# Patient Record
Sex: MALE | URBAN - METROPOLITAN AREA
[De-identification: names, ages, dates, MRNs, and addresses within clinical notes are randomized per-mention and may not be internally consistent; named-entity substitution may affect disease eponyms.]

---

## 2023-03-07 ENCOUNTER — NURSING HOME VISIT (OUTPATIENT)
Dept: POST ACUTE CARE | Facility: EXTERNAL LOCATION | Age: 88
End: 2023-03-07
Payer: MEDICARE

## 2023-03-07 DIAGNOSIS — I15.2 HYPERTENSION ASSOCIATED WITH DIABETES (MULTI): ICD-10-CM

## 2023-03-07 DIAGNOSIS — E87.20 METABOLIC ACIDOSIS: ICD-10-CM

## 2023-03-07 DIAGNOSIS — I44.30 AV BLOCK: ICD-10-CM

## 2023-03-07 DIAGNOSIS — E11.59 HYPERTENSION ASSOCIATED WITH DIABETES (MULTI): ICD-10-CM

## 2023-03-07 DIAGNOSIS — I25.118 CORONARY ARTERY DISEASE INVOLVING NATIVE HEART WITH OTHER FORM OF ANGINA PECTORIS, UNSPECIFIED VESSEL OR LESION TYPE (CMS-HCC): ICD-10-CM

## 2023-03-07 DIAGNOSIS — N17.9 AKI (ACUTE KIDNEY INJURY) (CMS-HCC): Primary | ICD-10-CM

## 2023-03-07 DIAGNOSIS — E11.40 TYPE 2 DIABETES MELLITUS WITH DIABETIC NEUROPATHY, UNSPECIFIED WHETHER LONG TERM INSULIN USE (MULTI): ICD-10-CM

## 2023-03-07 PROCEDURE — 99348 HOME/RES VST EST LOW MDM 30: CPT | Performed by: EMERGENCY MEDICINE

## 2023-03-07 NOTE — LETTER
Patient: Jersey Del Valle  : 1927    Encounter Date: 2023     Provider Impression     89-year-old      [facility]        PRINCIPAL DIAGNOSES: Acute kidney injury, hyperchloremic non-anion gap  metabolic acidosis, type B lactic acidosis, hypothermia, diabetes type  2, hypertension, hypomagnesemia, demand ischemia, possible urinary tract  infection, dehydration, atelectasis, Mobitz 1 atrioventricular block.           aspirin 81 mg oral delayed release tablet, 81 mg= 1 tabs, ORAL, QHS     Flomax 0.4 mg oral capsule, 0.4 mg= 1 caps, ORAL, QHS     lisinopril 10 mg oral tablet, 20 mg= 2 tabs, ORAL, QHS     multivitamin, 1 tabs, ORAL, QHS     Provide safe environment for the patient     Continue current medication regimen     OT PT and speech therapy     Bowel and bladder,skin care     Nutritional support      monitor and treat blood glucose     GI and DVT prophylaxis     PRN medications     Periodic lab work     Regular Follow up      Charting was completed using electronic voice recognition technology and may have unintended errors which may not have been completely corrected.        History of Present Illness  89-year-old      Patient is unable to give detailed history and therefore history is obtained from the chart     No acute complaints or concerns raised by nursing           History from hospitalization-  This 89-year-old gentleman presents to [facility] emergency department. This is a patient who apparently fell and then was left outside for some time he was unable to get up and move around. Apparently he does have some cognitive issues he lives by himself he does appear to be confused today on my examination the granddaughter noticed that on the ring, that the patient was on the ground outside he has been outside for some time EMS was called he is brought back into the hospital where he was resuscitated with fluids and was treated for hypothermia     Problem List/Past Medical History       Ongoing   Hypertension     Prostate cancer     Historical   No qualifying data     Procedure/Surgical History   No qualifying data available.     Medications      Inpatient   0.9%NaCl (Normal Saline) 1,000 mL, 1000 mL, IV     Ambien, 2.5 mg= 1 EA, ORAL, QHS/[id number], PRN     amLODIPine, 5 mg= 1 tabs, ORAL, BID     aspirin, 81 mg= 1 tabs, ORAL, QHS     ciprofloxacin = Cipro, 200 mg= 100 mL, IV Piggyback, [id number]     Flomax, 0.4 mg= 1 caps, ORAL, QHS     multivitamin, 1 tabs, ORAL, QHS     Saline Flush, 3 mL, IV Push, [id number]     Saline Flush, 3 mL, IV Push, PRN, PRN     Tylenol, 650 mg= 2 tabs, ORAL, [id number], PRN     Home   aspirin 81 mg oral delayed release tablet, 81 mg= 1 tabs, ORAL, QHS     Flomax 0.4 mg oral capsule, 0.4 mg= 1 caps, ORAL, QHS     lisinopril 10 mg oral tablet, 20 mg= 2 tabs, ORAL, QHS     multivitamin, 1 tabs, ORAL, QHS     Allergies      Bactrim     Hytrin     penicillin     Social History         Alcohol - Denies Alcohol Use        Substance Abuse - Denies Substance Abuse        Tobacco - Denies Tobacco Use      Review of Systems  All system review as allowed by patient condition and nursing input is negative        Physical Exam     Vital signs as per nursing/MA documentation were reviewd  General appearance: Alert and in no acute distress  HEENT: Normal Inspection  Neck - Normal Inspectiopn  Respiratory : No respiratory distress. Lungs are clear   Cardiovascular: heart rate normal. No gallop  Back - normal inspection  Skin inspection:Warm  Musculoskeletal : No deformities  Neuro : Limited exam. Baseline  Psychiatric : Cooperative        Results/Data  Records including but not limited to electronic medical records, relevant lab work and imaging from patient's health care facility encounter were reviewed and independently verified      Electronically Signed By: Sekou Shannon MD   4/11/23  2:56 PM

## 2023-04-11 PROBLEM — I25.10 CORONARY ARTERY DISEASE INVOLVING NATIVE HEART: Status: ACTIVE | Noted: 2023-04-11

## 2023-04-11 PROBLEM — E87.20 METABOLIC ACIDOSIS: Status: ACTIVE | Noted: 2023-04-11

## 2023-04-11 PROBLEM — N17.9 AKI (ACUTE KIDNEY INJURY) (CMS-HCC): Status: ACTIVE | Noted: 2023-04-11

## 2023-04-11 PROBLEM — E11.40 TYPE 2 DIABETES MELLITUS WITH DIABETIC NEUROPATHY (MULTI): Status: ACTIVE | Noted: 2023-04-11

## 2023-04-11 PROBLEM — I44.30 AV BLOCK: Status: ACTIVE | Noted: 2023-04-11

## 2023-04-11 PROBLEM — E11.59 HYPERTENSION ASSOCIATED WITH DIABETES (MULTI): Status: ACTIVE | Noted: 2023-04-11

## 2023-04-11 PROBLEM — I15.2 HYPERTENSION ASSOCIATED WITH DIABETES (MULTI): Status: ACTIVE | Noted: 2023-04-11

## 2023-04-11 NOTE — PROGRESS NOTES
Provider Impression     89-year-old      [facility]        PRINCIPAL DIAGNOSES: Acute kidney injury, hyperchloremic non-anion gap  metabolic acidosis, type B lactic acidosis, hypothermia, diabetes type  2, hypertension, hypomagnesemia, demand ischemia, possible urinary tract  infection, dehydration, atelectasis, Mobitz 1 atrioventricular block.           aspirin 81 mg oral delayed release tablet, 81 mg= 1 tabs, ORAL, QHS     Flomax 0.4 mg oral capsule, 0.4 mg= 1 caps, ORAL, QHS     lisinopril 10 mg oral tablet, 20 mg= 2 tabs, ORAL, QHS     multivitamin, 1 tabs, ORAL, QHS     Provide safe environment for the patient     Continue current medication regimen     OT PT and speech therapy     Bowel and bladder,skin care     Nutritional support      monitor and treat blood glucose     GI and DVT prophylaxis     PRN medications     Periodic lab work     Regular Follow up      Charting was completed using electronic voice recognition technology and may have unintended errors which may not have been completely corrected.        History of Present Illness  89-year-old      Patient is unable to give detailed history and therefore history is obtained from the chart     No acute complaints or concerns raised by nursing           History from hospitalization-  This 89-year-old gentleman presents to [facility] emergency department. This is a patient who apparently fell and then was left outside for some time he was unable to get up and move around. Apparently he does have some cognitive issues he lives by himself he does appear to be confused today on my examination the granddaughter noticed that on the ring, that the patient was on the ground outside he has been outside for some time EMS was called he is brought back into the hospital where he was resuscitated with fluids and was treated for hypothermia     Problem List/Past Medical History      Ongoing   Hypertension     Prostate cancer     Historical   No  qualifying data     Procedure/Surgical History   No qualifying data available.     Medications      Inpatient   0.9%NaCl (Normal Saline) 1,000 mL, 1000 mL, IV     Ambien, 2.5 mg= 1 EA, ORAL, QHS/[id number], PRN     amLODIPine, 5 mg= 1 tabs, ORAL, BID     aspirin, 81 mg= 1 tabs, ORAL, QHS     ciprofloxacin = Cipro, 200 mg= 100 mL, IV Piggyback, [id number]     Flomax, 0.4 mg= 1 caps, ORAL, QHS     multivitamin, 1 tabs, ORAL, QHS     Saline Flush, 3 mL, IV Push, [id number]     Saline Flush, 3 mL, IV Push, PRN, PRN     Tylenol, 650 mg= 2 tabs, ORAL, [id number], PRN     Home   aspirin 81 mg oral delayed release tablet, 81 mg= 1 tabs, ORAL, QHS     Flomax 0.4 mg oral capsule, 0.4 mg= 1 caps, ORAL, QHS     lisinopril 10 mg oral tablet, 20 mg= 2 tabs, ORAL, QHS     multivitamin, 1 tabs, ORAL, QHS     Allergies      Bactrim     Hytrin     penicillin     Social History         Alcohol - Denies Alcohol Use        Substance Abuse - Denies Substance Abuse        Tobacco - Denies Tobacco Use      Review of Systems  All system review as allowed by patient condition and nursing input is negative        Physical Exam     Vital signs as per nursing/MA documentation were reviewd  General appearance: Alert and in no acute distress  HEENT: Normal Inspection  Neck - Normal Inspectiopn  Respiratory : No respiratory distress. Lungs are clear   Cardiovascular: heart rate normal. No gallop  Back - normal inspection  Skin inspection:Warm  Musculoskeletal : No deformities  Neuro : Limited exam. Baseline  Psychiatric : Cooperative        Results/Data  Records including but not limited to electronic medical records, relevant lab work and imaging from patient's health care facility encounter were reviewed and independently verified

## 2023-06-13 ENCOUNTER — HOME VISIT (OUTPATIENT)
Dept: POST ACUTE CARE | Facility: EXTERNAL LOCATION | Age: 88
End: 2023-06-13
Payer: MEDICARE

## 2023-06-13 DIAGNOSIS — N17.9 AKI (ACUTE KIDNEY INJURY) (CMS-HCC): ICD-10-CM

## 2023-06-13 DIAGNOSIS — I25.118 CORONARY ARTERY DISEASE INVOLVING NATIVE HEART WITH OTHER FORM OF ANGINA PECTORIS, UNSPECIFIED VESSEL OR LESION TYPE (CMS-HCC): ICD-10-CM

## 2023-06-13 DIAGNOSIS — E11.59 HYPERTENSION ASSOCIATED WITH DIABETES (MULTI): Primary | ICD-10-CM

## 2023-06-13 DIAGNOSIS — I15.2 HYPERTENSION ASSOCIATED WITH DIABETES (MULTI): Primary | ICD-10-CM

## 2023-06-13 DIAGNOSIS — E11.40 TYPE 2 DIABETES MELLITUS WITH DIABETIC NEUROPATHY, UNSPECIFIED WHETHER LONG TERM INSULIN USE (MULTI): ICD-10-CM

## 2023-06-13 PROCEDURE — 99348 HOME/RES VST EST LOW MDM 30: CPT | Performed by: EMERGENCY MEDICINE

## 2023-06-22 NOTE — PROGRESS NOTES
Provider Impression        [facility]        PRINCIPAL DIAGNOSES: Acute kidney injury, hyperchloremic non-anion gap  metabolic acidosis, type B lactic acidosis, hypothermia, diabetes type  2, hypertension, hypomagnesemia, demand ischemia, possible urinary tract  infection, dehydration, atelectasis, Mobitz 1 atrioventricular block.           aspirin 81 mg oral delayed release tablet, 81 mg= 1 tabs, ORAL, QHS     Flomax 0.4 mg oral capsule, 0.4 mg= 1 caps, ORAL, QHS     lisinopril 10 mg oral tablet, 20 mg= 2 tabs, ORAL, QHS     multivitamin, 1 tabs, ORAL, QHS     Rx list reviewed.   PT and OT evaluation is in the process.   Routine safety measures, fall precautions, risk modification and alarm placement if needed for prevention of falls.   Skin care precautions, prevention of pressures sores at pressure points assessed.   Pt needs to be monitored frequently by nursing staff particularly at night time.   Any confusion, agitation or behavioural disturbance needs to be attended, as per home policy   rapid covid Ag assay need to be done, notify if positive.   If needed appropriate measures to be taken for alarm placements and assisted devices, pt was told not to get up and ambulate at night unless help and assist available at bedside,   labs will be done as per our routine protocol.   PO intake need to be monitored if consuming po.       Charting is done using voice recognition software and may contain errors which have not been completely corrected          History of Present Illness    Patient is unable to give detailed history and therefore history is obtained from the chart     No acute complaints or concerns raised by nursing           History from prior hospitalization-  This  gentleman presents to [facility] emergency department. This is a patient who apparently fell and then was left outside for some time he was unable to get up and move around. Apparently he does have some cognitive issues he  lives by himself he does appear to be confused today on my examination the granddaughter noticed that on the ring, that the patient was on the ground outside he has been outside for some time EMS was called he is brought back into the hospital where he was resuscitated with fluids and was treated for hypothermia     Problem List/Past Medical History      Ongoing   Hypertension     Prostate cancer     Historical   No qualifying data     Procedure/Surgical History   No qualifying data available.     Medications      Inpatient   0.9%NaCl (Normal Saline) 1,000 mL, 1000 mL, IV     Ambien, 2.5 mg= 1 EA, ORAL, QHS/[id number], PRN     amLODIPine, 5 mg= 1 tabs, ORAL, BID     aspirin, 81 mg= 1 tabs, ORAL, QHS     ciprofloxacin = Cipro, 200 mg= 100 mL, IV Piggyback, [id number]     Flomax, 0.4 mg= 1 caps, ORAL, QHS     multivitamin, 1 tabs, ORAL, QHS     Saline Flush, 3 mL, IV Push, [id number]     Saline Flush, 3 mL, IV Push, PRN, PRN     Tylenol, 650 mg= 2 tabs, ORAL, [id number], PRN     Home   aspirin 81 mg oral delayed release tablet, 81 mg= 1 tabs, ORAL, QHS     Flomax 0.4 mg oral capsule, 0.4 mg= 1 caps, ORAL, QHS     lisinopril 10 mg oral tablet, 20 mg= 2 tabs, ORAL, QHS     multivitamin, 1 tabs, ORAL, QHS     Allergies      Bactrim     Hytrin     penicillin     Social History         Alcohol - Denies Alcohol Use        Substance Abuse - Denies Substance Abuse        Tobacco - Denies Tobacco Use      Review of Systems  All system review as allowed by patient condition and nursing input is negative        Physical Exam     Vital signs as per nursing/MA documentation were reviewd  General appearance: Alert and in no acute distress  HEENT: Normal Inspection  Neck - Normal Inspectiopn  Respiratory : No respiratory distress. Lungs are clear   Cardiovascular: heart rate normal. No gallop  Back - normal inspection  Skin inspection:Warm  Musculoskeletal : No deformities  Neuro : Limited exam. Baseline  Psychiatric : Cooperative         Results/Data  Records including but not limited to electronic medical records, relevant lab work and imaging from patient's health care facility encounter were reviewed and independently verified

## 2023-09-07 ENCOUNTER — HOME VISIT (OUTPATIENT)
Dept: POST ACUTE CARE | Facility: EXTERNAL LOCATION | Age: 88
End: 2023-09-07
Payer: MEDICARE

## 2023-09-07 DIAGNOSIS — N17.9 AKI (ACUTE KIDNEY INJURY) (CMS-HCC): ICD-10-CM

## 2023-09-07 DIAGNOSIS — E11.40 TYPE 2 DIABETES MELLITUS WITH DIABETIC NEUROPATHY, UNSPECIFIED WHETHER LONG TERM INSULIN USE (MULTI): ICD-10-CM

## 2023-09-07 DIAGNOSIS — I15.2 HYPERTENSION ASSOCIATED WITH DIABETES (MULTI): ICD-10-CM

## 2023-09-07 DIAGNOSIS — E11.59 HYPERTENSION ASSOCIATED WITH DIABETES (MULTI): ICD-10-CM

## 2023-09-07 DIAGNOSIS — Z00.00 WELLNESS EXAMINATION: Primary | ICD-10-CM

## 2023-09-07 PROCEDURE — 99397 PER PM REEVAL EST PAT 65+ YR: CPT | Performed by: EMERGENCY MEDICINE

## 2023-09-07 PROCEDURE — 99348 HOME/RES VST EST LOW MDM 30: CPT | Performed by: EMERGENCY MEDICINE

## 2023-09-07 PROCEDURE — 99497 ADVNCD CARE PLAN 30 MIN: CPT | Performed by: EMERGENCY MEDICINE

## 2023-09-07 PROCEDURE — G0439 PPPS, SUBSEQ VISIT: HCPCS | Performed by: EMERGENCY MEDICINE

## 2023-09-18 NOTE — PROGRESS NOTES
Annual Medicare wellness and physical    Past Medical, Surgical and Family History: reviewed and updated in chart.   Medications and Supplements: Review of all medications by a prescribing practitioner or clinical pharmacist (such as prescriptions, OTCs, herbal therapies and supplements) documented in the medical record.    No, the patient is not using opioids.   Tobacco use: Non-User The patient has never smoked cigarettes.   Alcohol use: Non-User   Illicit drug use: Non-User   Current diet: well balanced diet.   Exercise Frequency: the patient does not exercise.   Depression/Suicide Screening: Patient has a current diagnosis of depression.    Hearing Impairment: none.   Cognitive Impairment: No cognitive impairment observed.     Bathing: needs assistance.   Dressing: needs assistance.   Walking: needs assistance.   Toileting: needs assistance.   Feeding: needs assistance.   Personal Hygiene: needs assistance.   Managing Finances: needs assistance.   Shopping: needs assistance.   Managing Medications: needs assistance.   Housework / Basic Home Maintenance: needs assistance.   Handling Transportation: needs assistance.   Preparing Meals: needs assistance.   Using the Telephone/ Communication Devices: needs assistance.    Falls Risk Screening:. Patient has not fallen in the last 6 months.   Home safety risk factors: none.   Advance directives:. Advanced Care Planning discussed and documented advance care plan or surrogate decision maker documented in the medical record.   A Conversation about Advance directives of at least 16 minutes has occurred. Actual time spent: 20   Topics discussed: Code status per nursing documentation.   Nursing home/rehabilitation note      Provider Impression        [facility]        PRINCIPAL DIAGNOSES: Acute kidney injury, hyperchloremic non-anion gap  metabolic acidosis, type B lactic acidosis, hypothermia, diabetes type  2, hypertension, hypomagnesemia, demand  ischemia, possible urinary tract  infection, dehydration, atelectasis, Mobitz 1 atrioventricular block.           aspirin 81 mg oral delayed release tablet, 81 mg= 1 tabs, ORAL, QHS     Flomax 0.4 mg oral capsule, 0.4 mg= 1 caps, ORAL, QHS     lisinopril 10 mg oral tablet, 20 mg= 2 tabs, ORAL, QHS     multivitamin, 1 tabs, ORAL, QHS     1. medications are reviewed      2. Continue with rehabilitative, supportive, and or restorative care as ordered and as the patient tolerates     3. Laboratory evaluations will be monitored on an ongoing as needed basis     4. Medications have been cross-referenced with the patient's diagnoses list, and medications reductions have been considered and/or implemented.     5. Pharmacy recommendations are addressed on an ongoing as needed basis.     6. Controlled substances have been electronically scripted every 60 days for opiates and others of similar schedule, and every 6 months for sedative/hypnotics and others of similar schedule.     7. Nursing has been queried about any potential adverse events that need to be reported to me.    Salient information and adjustment of care plan pertaining to this individual patient interaction today are the following:      A. We will continue with restorative and supportive care as the patient tolerates    B. Laboratory examinations will continue to be drawn on an ongoing as-needed basis. The patient's weight needs to be monitored and if needed we may need to institute appetite stimulating medication    C. The patient's long term prognosis is guarded    Charting is done using voice recognition software and may contain errors which may not have been completely corrected          History of Present Illness    Patient is unable to give detailed history and therefore history is obtained from the chart     No acute complaints or concerns raised by nursing           History from prior hospitalization-  This  gentleman presents to [facility] emergency  department. This is a patient who apparently fell and then was left outside for some time he was unable to get up and move around. Apparently he does have some cognitive issues he lives by himself he does appear to be confused today on my examination the granddaughter noticed that on the ring, that the patient was on the ground outside he has been outside for some time EMS was called he is brought back into the hospital where he was resuscitated with fluids and was treated for hypothermia     Problem List/Past Medical History      Ongoing   Hypertension     Prostate cancer     Historical   No qualifying data     Procedure/Surgical History   No qualifying data available.     Medications      Inpatient   0.9%NaCl (Normal Saline) 1,000 mL, 1000 mL, IV     Ambien, 2.5 mg= 1 EA, ORAL, QHS/[id number], PRN     amLODIPine, 5 mg= 1 tabs, ORAL, BID     aspirin, 81 mg= 1 tabs, ORAL, QHS     ciprofloxacin = Cipro, 200 mg= 100 mL, IV Piggyback, [id number]     Flomax, 0.4 mg= 1 caps, ORAL, QHS     multivitamin, 1 tabs, ORAL, QHS     Saline Flush, 3 mL, IV Push, [id number]     Saline Flush, 3 mL, IV Push, PRN, PRN     Tylenol, 650 mg= 2 tabs, ORAL, [id number], PRN     Home   aspirin 81 mg oral delayed release tablet, 81 mg= 1 tabs, ORAL, QHS     Flomax 0.4 mg oral capsule, 0.4 mg= 1 caps, ORAL, QHS     lisinopril 10 mg oral tablet, 20 mg= 2 tabs, ORAL, QHS     multivitamin, 1 tabs, ORAL, QHS     Allergies      Bactrim     Hytrin     penicillin     Social History         Alcohol - Denies Alcohol Use        Substance Abuse - Denies Substance Abuse        Tobacco - Denies Tobacco Use      Review of Systems  All system review as allowed by patient condition and nursing input is negative        Physical Exam     Vital signs as per nursing/MA documentation were reviewd  General appearance: Alert and in no acute distress  HEENT: Normal Inspection  Neck - Normal Inspectiopn  Respiratory : No respiratory distress. Lungs are clear    Cardiovascular: heart rate normal. No gallop  Back - normal inspection  Skin inspection:Warm  Musculoskeletal : No deformities  Neuro : Limited exam. Baseline  Psychiatric : Cooperative        Results/Data  Records including but not limited to electronic medical records, relevant lab work and imaging from patient's health care facility encounter were reviewed and independently verified

## 2024-01-02 ENCOUNTER — HOME VISIT (OUTPATIENT)
Dept: POST ACUTE CARE | Facility: EXTERNAL LOCATION | Age: 89
End: 2024-01-02
Payer: MEDICARE

## 2024-01-02 DIAGNOSIS — E11.59 HYPERTENSION ASSOCIATED WITH DIABETES (MULTI): Primary | ICD-10-CM

## 2024-01-02 DIAGNOSIS — I15.2 HYPERTENSION ASSOCIATED WITH DIABETES (MULTI): Primary | ICD-10-CM

## 2024-01-02 DIAGNOSIS — E87.20 METABOLIC ACIDOSIS: ICD-10-CM

## 2024-01-02 DIAGNOSIS — N17.9 AKI (ACUTE KIDNEY INJURY) (CMS-HCC): ICD-10-CM

## 2024-01-02 DIAGNOSIS — E11.40 TYPE 2 DIABETES MELLITUS WITH DIABETIC NEUROPATHY, UNSPECIFIED WHETHER LONG TERM INSULIN USE (MULTI): ICD-10-CM

## 2024-01-02 PROCEDURE — 99349 HOME/RES VST EST MOD MDM 40: CPT | Performed by: EMERGENCY MEDICINE

## 2024-01-19 NOTE — PROGRESS NOTES
Provider Impression        [facility]        PRINCIPAL DIAGNOSES: Acute kidney injury, hyperchloremic non-anion gap  metabolic acidosis, type B lactic acidosis, hypothermia, diabetes type  2, hypertension, hypomagnesemia, demand ischemia, possible urinary tract  infection, dehydration, atelectasis, Mobitz 1 atrioventricular block.           aspirin 81 mg oral delayed release tablet, 81 mg= 1 tabs, ORAL, QHS     Flomax 0.4 mg oral capsule, 0.4 mg= 1 caps, ORAL, QHS     lisinopril 10 mg oral tablet, 20 mg= 2 tabs, ORAL, QHS     multivitamin, 1 tabs, ORAL, QHS     This patient was seen for my regular monthly visit, nursing evaluations and nursing notes were reviewed, interim events are reviewed, interim concerns and messages were reviewed as we have communicated with nursing staff.  Any issues with the falls, skin care impairment, declining physical condition are reviewed and noted, diagnosis list were reviewed, list of medications were reviewed, living will related issues were reviewed, overall patient has been doing well, any declining in patient's condition or any change in patient's condition needs to be notified to physician promptly, discussed with nursing staff, if needed would communicate with family.  Patient stays confined here at the facility for long-term care, there are always concerns about long-term care related issues and concerns.  Nursing staff is trying their best to keep patient safe, all sort of measures has been taken to keep patient safe and comfortable.           History of Present Illness    Patient is unable to give detailed history and therefore history is obtained from the chart     No acute complaints or concerns raised by nursing           History from prior hospitalization-  This  gentleman presents to [facility] emergency department. This is a patient who apparently fell and then was left outside for some time he was unable to get up and move around. Apparently he does  have some cognitive issues he lives by himself he does appear to be confused today on my examination the granddaughter noticed that on the ring, that the patient was on the ground outside he has been outside for some time EMS was called he is brought back into the hospital where he was resuscitated with fluids and was treated for hypothermia     Problem List/Past Medical History      Ongoing   Hypertension     Prostate cancer     Historical   No qualifying data     Procedure/Surgical History   No qualifying data available.     Medications      Inpatient   0.9%NaCl (Normal Saline) 1,000 mL, 1000 mL, IV     Ambien, 2.5 mg= 1 EA, ORAL, QHS/[id number], PRN     amLODIPine, 5 mg= 1 tabs, ORAL, BID     aspirin, 81 mg= 1 tabs, ORAL, QHS     ciprofloxacin = Cipro, 200 mg= 100 mL, IV Piggyback, [id number]     Flomax, 0.4 mg= 1 caps, ORAL, QHS     multivitamin, 1 tabs, ORAL, QHS     Saline Flush, 3 mL, IV Push, [id number]     Saline Flush, 3 mL, IV Push, PRN, PRN     Tylenol, 650 mg= 2 tabs, ORAL, [id number], PRN     Home   aspirin 81 mg oral delayed release tablet, 81 mg= 1 tabs, ORAL, QHS     Flomax 0.4 mg oral capsule, 0.4 mg= 1 caps, ORAL, QHS     lisinopril 10 mg oral tablet, 20 mg= 2 tabs, ORAL, QHS     multivitamin, 1 tabs, ORAL, QHS     Allergies      Bactrim     Hytrin     penicillin     Social History         Alcohol - Denies Alcohol Use        Substance Abuse - Denies Substance Abuse        Tobacco - Denies Tobacco Use      Review of Systems  All system review as allowed by patient condition and nursing input is negative        Physical Exam     Vital signs as per nursing/MA documentation were reviewd  General appearance: Alert and in no acute distress  HEENT: Normal Inspection  Neck - Normal Inspectiopn  Respiratory : No respiratory distress. Lungs are clear   Cardiovascular: heart rate normal. No gallop  Back - normal inspection  Skin inspection:Warm  Musculoskeletal : No deformities  Neuro : Limited exam.  Baseline  Psychiatric : Cooperative        Results/Data  Records including but not limited to electronic medical records, relevant lab work and imaging from patient's health care facility encounter were reviewed and independently verified

## 2024-04-02 ENCOUNTER — HOME VISIT (OUTPATIENT)
Dept: POST ACUTE CARE | Facility: EXTERNAL LOCATION | Age: 89
End: 2024-04-02
Payer: MEDICARE

## 2024-04-02 DIAGNOSIS — I25.118 CORONARY ARTERY DISEASE INVOLVING NATIVE HEART WITH OTHER FORM OF ANGINA PECTORIS, UNSPECIFIED VESSEL OR LESION TYPE (CMS-HCC): ICD-10-CM

## 2024-04-02 DIAGNOSIS — N17.9 AKI (ACUTE KIDNEY INJURY) (CMS-HCC): ICD-10-CM

## 2024-04-02 DIAGNOSIS — E11.59 HYPERTENSION ASSOCIATED WITH DIABETES (MULTI): Primary | ICD-10-CM

## 2024-04-02 DIAGNOSIS — E87.20 METABOLIC ACIDOSIS: ICD-10-CM

## 2024-04-02 DIAGNOSIS — I15.2 HYPERTENSION ASSOCIATED WITH DIABETES (MULTI): Primary | ICD-10-CM

## 2024-04-02 PROCEDURE — 99348 HOME/RES VST EST LOW MDM 30: CPT | Performed by: EMERGENCY MEDICINE

## 2024-04-14 NOTE — PROGRESS NOTES
Provider Impression        [facility]        PRINCIPAL DIAGNOSES: Acute kidney injury, hyperchloremic non-anion gap  metabolic acidosis, type B lactic acidosis, hypothermia, diabetes type  2, hypertension, hypomagnesemia, demand ischemia, possible urinary tract  infection, dehydration, atelectasis, Mobitz 1 atrioventricular block.           aspirin 81 mg oral delayed release tablet, 81 mg= 1 tabs, ORAL, QHS     Flomax 0.4 mg oral capsule, 0.4 mg= 1 caps, ORAL, QHS     lisinopril 10 mg oral tablet, 20 mg= 2 tabs, ORAL, QHS     multivitamin, 1 tabs, ORAL, QHS     -Fall prevention    -Cognitive engagement     -Monitor and treat blood pressure     -Aggressive decubitus ulcer prevention.     -Bowel and bladder care     -Optimal nutrition and supplementation as needed     -GI  and DVT prophylaxis     -Symptom control     -Ambulation as tolerated     -Will follow    Charting is done using voice recognition software and may contain errors which have not been completely corrected          History of Present Illness    Patient is unable to give detailed history and therefore history is obtained from the chart     No acute complaints or concerns raised by nursing           History from prior hospitalization-  This  gentleman presents to [facility] emergency department. This is a patient who apparently fell and then was left outside for some time he was unable to get up and move around. Apparently he does have some cognitive issues he lives by himself he does appear to be confused today on my examination the granddaughter noticed that on the ring, that the patient was on the ground outside he has been outside for some time EMS was called he is brought back into the hospital where he was resuscitated with fluids and was treated for hypothermia     Problem List/Past Medical History      Ongoing   Hypertension     Prostate cancer     Historical   No qualifying data     Procedure/Surgical History   No qualifying  data available.     Medications      Inpatient   0.9%NaCl (Normal Saline) 1,000 mL, 1000 mL, IV     Ambien, 2.5 mg= 1 EA, ORAL, QHS/[id number], PRN     amLODIPine, 5 mg= 1 tabs, ORAL, BID     aspirin, 81 mg= 1 tabs, ORAL, QHS     ciprofloxacin = Cipro, 200 mg= 100 mL, IV Piggyback, [id number]     Flomax, 0.4 mg= 1 caps, ORAL, QHS     multivitamin, 1 tabs, ORAL, QHS     Saline Flush, 3 mL, IV Push, [id number]     Saline Flush, 3 mL, IV Push, PRN, PRN     Tylenol, 650 mg= 2 tabs, ORAL, [id number], PRN     Home   aspirin 81 mg oral delayed release tablet, 81 mg= 1 tabs, ORAL, QHS     Flomax 0.4 mg oral capsule, 0.4 mg= 1 caps, ORAL, QHS     lisinopril 10 mg oral tablet, 20 mg= 2 tabs, ORAL, QHS     multivitamin, 1 tabs, ORAL, QHS     Allergies      Bactrim     Hytrin     penicillin     Social History         Alcohol - Denies Alcohol Use        Substance Abuse - Denies Substance Abuse        Tobacco - Denies Tobacco Use      Review of Systems  All system review as allowed by patient condition and nursing input is negative        Physical Exam     Vital signs as per nursing/MA documentation were reviewd  General appearance: Alert and in no acute distress  HEENT: Normal Inspection  Neck - Normal Inspectiopn  Respiratory : No respiratory distress. Lungs are clear   Cardiovascular: heart rate normal. No gallop  Back - normal inspection  Skin inspection:Warm  Musculoskeletal : No deformities  Neuro : Limited exam. Baseline  Psychiatric : Cooperative        Results/Data  Records including but not limited to electronic medical records, relevant lab work and imaging from patient's health care facility encounter were reviewed and independently verified

## 2024-07-16 ENCOUNTER — HOME VISIT (OUTPATIENT)
Dept: POST ACUTE CARE | Facility: EXTERNAL LOCATION | Age: 89
End: 2024-07-16
Payer: MEDICARE

## 2024-07-16 DIAGNOSIS — I15.2 HYPERTENSION ASSOCIATED WITH DIABETES (MULTI): ICD-10-CM

## 2024-07-16 DIAGNOSIS — I25.118 CORONARY ARTERY DISEASE INVOLVING NATIVE HEART WITH OTHER FORM OF ANGINA PECTORIS, UNSPECIFIED VESSEL OR LESION TYPE (CMS-HCC): ICD-10-CM

## 2024-07-16 DIAGNOSIS — E87.20 METABOLIC ACIDOSIS: ICD-10-CM

## 2024-07-16 DIAGNOSIS — E11.59 HYPERTENSION ASSOCIATED WITH DIABETES (MULTI): ICD-10-CM

## 2024-07-16 DIAGNOSIS — E11.40 TYPE 2 DIABETES MELLITUS WITH DIABETIC NEUROPATHY, UNSPECIFIED WHETHER LONG TERM INSULIN USE (MULTI): ICD-10-CM

## 2024-07-16 DIAGNOSIS — N17.9 AKI (ACUTE KIDNEY INJURY) (CMS-HCC): Primary | ICD-10-CM

## 2024-07-16 PROCEDURE — 99349 HOME/RES VST EST MOD MDM 40: CPT | Performed by: EMERGENCY MEDICINE

## 2024-08-04 NOTE — PROGRESS NOTES
Provider Impression        [facility]        PRINCIPAL DIAGNOSES:     Acute kidney injury, hyperchloremic non-anion gap  metabolic acidosis, type B lactic acidosis, hypothermia, diabetes type  2, hypertension, hypomagnesemia, demand ischemia, possible urinary tract  infection, dehydration, atelectasis, Mobitz 1 atrioventricular block.            Bisacodyl Suppository 10 MG   Insert 1 application rectally every 24 hours as needed for constipation  Pharmacy Active 4/14/2022 12:00  4/14/2022  Diff-Stat Capsule (Probiotic Product)   Give 1 capsule by mouth one time a day for GI health  Pharmacy Active 4/15/2022 08:00  4/14/2022  MiraLax Powder 17 GM/SCOOP (Polyethylene Glycol 3350)   Give 17 gram by mouth every 24 hours as needed for constipation  Pharmacy Active 4/14/2022 12:30  4/14/2022  Aspirin Tablet   Give 81 mg by mouth one time a day related to LONG TERM (CURRENT) USE OF ASPIRIN (Z79.82)  Pharmacy Active 5/24/2022 08:00  5/17/2022  Lisinopril Tablet   Give 10 mg by mouth one time a day related to ESSENTIAL (PRIMARY) HYPERTENSION (I10)  Pharmacy Active 8/19/2022 08:00  8/18/2022  Tylenol Tablet (Acetaminophen)   Give 650 mg by mouth every 6 hours as needed for pain and/or fever  Pharmacy Active 8/23/2022 13:00  8/23/2022  Cholecalciferol Capsule   Give 5000 unit by mouth one time a day for vitamin D deficiency  Pharmacy Active 10/19/2022 08:00  10/18/2022  Flomax Capsule (Tamsulosin HCl)   Give 0.4 mg by mouth in the evening related to BENIGN PROSTATIC HYPERPLASIA WITHOUT LOWER URINARY TRACT SYMPTOMS (N40.0)  Pharmacy Active 1/10/2023 17:00  1/10/2023  Donepezil HCl Tablet   Give 10 mg by mouth in the evening related to AGE-RELATED COGNITIVE DECLINE (R41.81)  Pharmacy Active 1/10/2023 17:00  1/10/2023  Multi-Vitamins Tablet (Multiple Vitamin)   Give 1 tablet by mouth in the evening for supplement  Pharmacy Active 1/10/2023 17:00  1/10/2023  amLODIPine Besylate Tablet   Give 5 mg by mouth two  times a day related to ESSENTIAL (PRIMARY) HYPERTENSION (I10)  Pharmacy Active 1/10/2023 17:00  1/10/2023  Artificial Tears Ophthalmic Solution (Carboxymethylcellulose Sodium (Ophth))   Instill 2 drop in both eyes as needed for itchy eyes/dry eyes TWICE DAILY  Pharmacy Active 1/19/2023 12:30  1/19/2023  Eucerin External Cream (Skin Protectants, Misc.)   Apply to affected areas topically in the morning for dry, flaky skin  Pharmacy Active 10/6/2023 08:00  8/2/2024     Provide safe environment for the patient     Continue current medication regimen     OT PT and speech therapy     Bowel and bladder,skin care     Nutritional support     monitor and treat blood glucose     GI  and DVT prophylaxis     PRN medications     Periodic lab work    Regular Follow up    Charting is done using voice recognition software and may contain errors which have not been completely corrected          History of Present Illness    Patient is unable to give detailed history and therefore history is obtained from the chart     No acute complaints or concerns raised by nursing           History from prior hospitalization-  This  gentleman presents to [facility] emergency department. This is a patient who apparently fell and then was left outside for some time he was unable to get up and move around. Apparently he does have some cognitive issues he lives by himself he does appear to be confused today on my examination the granddaughter noticed that on the ring, that the patient was on the ground outside he has been outside for some time EMS was called he is brought back into the hospital where he was resuscitated with fluids and was treated for hypothermia     Problem List/Past Medical History      Ongoing   Hypertension     Prostate cancer     Historical   No qualifying data     Procedure/Surgical History   No qualifying data available.     Medications      Inpatient   0.9%NaCl (Normal Saline) 1,000 mL, 1000 mL, IV     Ambien, 2.5 mg= 1 EA,  ORAL, [id number]/[id number], PRN     amLODIPine, 5 mg= 1 tabs, ORAL, BID     aspirin, 81 mg= 1 tabs, ORAL, QHS     ciprofloxacin = Cipro, 200 mg= 100 mL, IV Piggyback, [id number]     Flomax, 0.4 mg= 1 caps, ORAL, QHS     multivitamin, 1 tabs, ORAL, QHS     Saline Flush, 3 mL, IV Push, [id number]     Saline Flush, 3 mL, IV Push, PRN, PRN     Tylenol, 650 mg= 2 tabs, ORAL, [id number], PRN     Home   aspirin 81 mg oral delayed release tablet, 81 mg= 1 tabs, ORAL, QHS     Flomax 0.4 mg oral capsule, 0.4 mg= 1 caps, ORAL, QHS     lisinopril 10 mg oral tablet, 20 mg= 2 tabs, ORAL, QHS     multivitamin, 1 tabs, ORAL, QHS     Allergies      Bactrim     Hytrin     penicillin     Social History         Alcohol - Denies Alcohol Use        Substance Abuse - Denies Substance Abuse        Tobacco - Denies Tobacco Use      Review of Systems  All system review as allowed by patient condition and nursing input is negative        Physical Exam     Vital signs as per nursing/MA documentation were reviewd  General appearance: Alert and in no acute distress  HEENT: Normal Inspection  Neck - Normal Inspectiopn  Respiratory : No respiratory distress. Lungs are clear   Cardiovascular: heart rate normal. No gallop  Back - normal inspection  Skin inspection:Warm  Musculoskeletal : No deformities  Neuro : Limited exam. Baseline  Psychiatric : Cooperative        Results/Data  Records including but not limited to electronic medical records, relevant lab work and imaging from patient's health care facility encounter were reviewed and independently verified

## 2024-10-01 ENCOUNTER — NURSING HOME VISIT (OUTPATIENT)
Dept: POST ACUTE CARE | Facility: EXTERNAL LOCATION | Age: 89
End: 2024-10-01
Payer: MEDICARE

## 2024-10-01 DIAGNOSIS — I25.118 CORONARY ARTERY DISEASE INVOLVING NATIVE HEART WITH OTHER FORM OF ANGINA PECTORIS, UNSPECIFIED VESSEL OR LESION TYPE: ICD-10-CM

## 2024-10-01 DIAGNOSIS — E87.20 METABOLIC ACIDOSIS: ICD-10-CM

## 2024-10-01 DIAGNOSIS — E11.40 TYPE 2 DIABETES MELLITUS WITH DIABETIC NEUROPATHY, UNSPECIFIED WHETHER LONG TERM INSULIN USE (MULTI): ICD-10-CM

## 2024-10-01 DIAGNOSIS — I15.2 HYPERTENSION ASSOCIATED WITH DIABETES (MULTI): ICD-10-CM

## 2024-10-01 DIAGNOSIS — Z00.00 WELLNESS EXAMINATION: Primary | ICD-10-CM

## 2024-10-01 DIAGNOSIS — E11.59 HYPERTENSION ASSOCIATED WITH DIABETES (MULTI): ICD-10-CM

## 2024-10-01 PROCEDURE — 99397 PER PM REEVAL EST PAT 65+ YR: CPT | Performed by: EMERGENCY MEDICINE

## 2024-10-01 PROCEDURE — G0439 PPPS, SUBSEQ VISIT: HCPCS | Performed by: EMERGENCY MEDICINE

## 2024-10-01 PROCEDURE — 99348 HOME/RES VST EST LOW MDM 30: CPT | Performed by: EMERGENCY MEDICINE

## 2024-10-01 PROCEDURE — 99497 ADVNCD CARE PLAN 30 MIN: CPT | Performed by: EMERGENCY MEDICINE

## 2024-10-01 NOTE — LETTER
Patient: Jersey Del Valle  : 1927    Encounter Date: 10/01/2024       Provider Impression        [facility]        PRINCIPAL DIAGNOSES:     Acute kidney injury, hyperchloremic non-anion gap  metabolic acidosis, type B lactic acidosis, hypothermia, diabetes type  2, hypertension, hypomagnesemia, demand ischemia, possible urinary tract  infection, dehydration, atelectasis, Mobitz 1 atrioventricular block.           Bisacodyl Suppository 10 MG   Insert 1 application rectally every 24 hours as needed for constipation  Pharmacy Active 2022 12:00  2022  Diff-Stat Capsule (Probiotic Product)   Give 1 capsule by mouth one time a day for GI health  Pharmacy Active 4/15/2022 08:00  2022  MiraLax Powder 17 GM/SCOOP (Polyethylene Glycol 3350)   Give 17 gram by mouth every 24 hours as needed for constipation  Pharmacy Active 2022 12:30  2022  Aspirin Tablet   Give 81 mg by mouth one time a day related to LONG TERM (CURRENT) USE OF ASPIRIN (Z79.82)  Pharmacy Active 2022 08:00  2022  Lisinopril Tablet   Give 10 mg by mouth one time a day related to ESSENTIAL (PRIMARY) HYPERTENSION (I10)  Pharmacy Active 2022 08:00  2022  Tylenol Tablet (Acetaminophen)   Give 650 mg by mouth every 6 hours as needed for pain and/or fever  Pharmacy Active 2022 13:00  2022  Cholecalciferol Capsule   Give 5000 unit by mouth one time a day for vitamin D deficiency  Pharmacy Active 10/19/2022 08:00  10/18/2022  Flomax Capsule (Tamsulosin HCl)   Give 0.4 mg by mouth in the evening related to BENIGN PROSTATIC HYPERPLASIA WITHOUT LOWER URINARY TRACT SYMPTOMS (N40.0)  Pharmacy Active 1/10/2023 17:00  1/10/2023  Donepezil HCl Tablet   Give 10 mg by mouth in the evening related to AGE-RELATED COGNITIVE DECLINE (R41.81)  Pharmacy Active 1/10/2023 17:00  1/10/2023  Multi-Vitamins Tablet (Multiple Vitamin)   Give 1 tablet by mouth in the evening for supplement  Pharmacy Active 1/10/2023  17:00  1/10/2023  amLODIPine Besylate Tablet   Give 5 mg by mouth two times a day related to ESSENTIAL (PRIMARY) HYPERTENSION (I10)  Pharmacy Active 1/10/2023 17:00  1/10/2023  Artificial Tears Ophthalmic Solution (Carboxymethylcellulose Sodium (Ophth))   Instill 2 drop in both eyes as needed for itchy eyes/dry eyes TWICE DAILY  Pharmacy Active 1/19/2023 12:30  1/19/2023  Eucerin External Cream (Skin Protectants, Misc.)   Apply to affected areas topically in the morning for dry, flaky skin  Pharmacy Active 10/6/2023 08:00  8/2/2024     Provide safe environment for the patient    Continue current medication regimen    OT PT and speech therapy    Bowel and bladder,skin care    Nutritional support    Monitor and treat blood glucose    GI  and DVT prophylaxis    PRN medications    Periodic lab work    Regular Follow up    The patient is being seen for the subsequent annual wellness visit.   Past Medical, Surgical and Family History: reviewed and updated in chart.   Medications and Supplements: Review of all medications by a prescribing practitioner or clinical pharmacist (such as prescriptions, OTCs, herbal therapies and supplements) documented in the medical record.   Patient Self Assessment of Health Status: fair.   Tobacco use: Non-User   Alcohol use: Non-User, As noted in social history   Illicit drug use: Non-User   Current diet: well balanced diet.   Exercise Frequency: infrequently.   Depression/Suicide Screening:  .   During the past 2 weeks, the patient has not felt down, depressed or hopeless.    During the past 2 weeks, the patient has not felt little interest or pleasure in doing things.    Hearing Impairment: Patient has slight hearing impairment.   Cognitive Impairment: Some cognitive impairment observed.      Bathing: needs assistance.   Dressing: needs assistance.   Walking: needs assistance.   Toileting: performs independently.   Feeding: performs independently.   Personal Hygiene: performs  independently.   Bowels: continent.   Bladder: continent.   Managing Finances: needs assistance.   Shopping: needs assistance.   Managing Medications: needs assistance.   Housework / Basic Home Maintenance: needs assistance.   Handling Transportation:  needs assistance.   Preparing Meals: Needs assistance  Using the Telephone/ Communication Devices: performs independently.   Patient resides at an assisted living facility   Falls Risk Screening:.  has not fallen   Home safety risk factors: none and Patient resides at an assisted living facility.   Advance directives:. Advanced Care Planning discussed and documented advance care plan or surrogate decision maker documented in the medical record.   A Conversation about Advance directives of at least 16 minutes has occurred.   Topics discussed: The code status is signed and filed with the assisted living facility.      1. medications are reviewed      2. Continue with rehabilitative, supportive, and or restorative care as ordered and as the patient tolerates     3. Laboratory evaluations will be monitored on an ongoing as needed basis     4. Medications have been cross-referenced with the patient's diagnoses list, and medications reductions have been considered and/or implemented.     5. Pharmacy recommendations are addressed on an ongoing as needed basis.     6. Controlled substances have been electronically scripted every 60 days for opiates and others of similar schedule, and every 6 months for sedative/hypnotics and others of similar schedule.     7. Nursing has been queried about any potential adverse events that need to be reported to me.    Salient information and adjustment of care plan pertaining to this individual patient interaction today are the following:      A. We will continue with restorative and supportive care as the patient tolerates    B. Laboratory examinations will continue to be drawn on an ongoing as-needed basis. The patient's weight needs to be  monitored and if needed we may need to institute appetite stimulating medication    C. The patient's long term prognosis is guarded    Charting is done using voice recognition software and may contain errors which may not have been completely corrected            History of Present Illness    Patient is unable to give detailed history and therefore history is obtained from the chart     No acute complaints or concerns raised by nursing           History from prior hospitalization-  This  gentleman presents to [facility] emergency department. This is a patient who apparently fell and then was left outside for some time he was unable to get up and move around. Apparently he does have some cognitive issues he lives by himself he does appear to be confused today on my examination the granddaughter noticed that on the ring, that the patient was on the ground outside he has been outside for some time EMS was called he is brought back into the hospital where he was resuscitated with fluids and was treated for hypothermia     Problem List/Past Medical History      Ongoing   Hypertension     Prostate cancer     Historical   No qualifying data     Procedure/Surgical History   No qualifying data available.     Medications      Inpatient   0.9%NaCl (Normal Saline) 1,000 mL, 1000 mL, IV     Ambien, 2.5 mg= 1 EA, ORAL, QHS/[id number], PRN     amLODIPine, 5 mg= 1 tabs, ORAL, BID     aspirin, 81 mg= 1 tabs, ORAL, QHS     ciprofloxacin = Cipro, 200 mg= 100 mL, IV Piggyback, [id number]     Flomax, 0.4 mg= 1 caps, ORAL, QHS     multivitamin, 1 tabs, ORAL, QHS     Saline Flush, 3 mL, IV Push, [id number]     Saline Flush, 3 mL, IV Push, PRN, PRN     Tylenol, 650 mg= 2 tabs, ORAL, [id number], PRN     Home   aspirin 81 mg oral delayed release tablet, 81 mg= 1 tabs, ORAL, QHS     Flomax 0.4 mg oral capsule, 0.4 mg= 1 caps, ORAL, QHS     lisinopril 10 mg oral tablet, 20 mg= 2 tabs, ORAL, QHS     multivitamin, 1 tabs, ORAL, QHS     Allergies       Bactrim     Hytrin     penicillin     Social History         Alcohol - Denies Alcohol Use        Substance Abuse - Denies Substance Abuse        Tobacco - Denies Tobacco Use      Review of Systems  All system review as allowed by patient condition and nursing input is negative        Physical Exam     Vital signs as per nursing/MA documentation were reviewd  General appearance: Alert and in no acute distress  HEENT: Normal Inspection  Neck - Normal Inspectiopn  Respiratory : No respiratory distress. Lungs are clear   Cardiovascular: heart rate normal. No gallop  Back - normal inspection  Skin inspection:Warm  Musculoskeletal : No deformities  Neuro : Limited exam. Baseline  Psychiatric : Cooperative        Results/Data  Records including but not limited to electronic medical records, relevant lab work and imaging from patient's health care facility encounter were reviewed and independently verified      Electronically Signed By: Sekou Shannon MD   10/10/24  4:41 PM

## 2024-10-02 NOTE — PROGRESS NOTES
Provider Impression        [facility]        PRINCIPAL DIAGNOSES:     Acute kidney injury, hyperchloremic non-anion gap  metabolic acidosis, type B lactic acidosis, hypothermia, diabetes type  2, hypertension, hypomagnesemia, demand ischemia, possible urinary tract  infection, dehydration, atelectasis, Mobitz 1 atrioventricular block.           Bisacodyl Suppository 10 MG   Insert 1 application rectally every 24 hours as needed for constipation  Pharmacy Active 4/14/2022 12:00  4/14/2022  Diff-Stat Capsule (Probiotic Product)   Give 1 capsule by mouth one time a day for GI health  Pharmacy Active 4/15/2022 08:00  4/14/2022  MiraLax Powder 17 GM/SCOOP (Polyethylene Glycol 3350)   Give 17 gram by mouth every 24 hours as needed for constipation  Pharmacy Active 4/14/2022 12:30  4/14/2022  Aspirin Tablet   Give 81 mg by mouth one time a day related to LONG TERM (CURRENT) USE OF ASPIRIN (Z79.82)  Pharmacy Active 5/24/2022 08:00  5/17/2022  Lisinopril Tablet   Give 10 mg by mouth one time a day related to ESSENTIAL (PRIMARY) HYPERTENSION (I10)  Pharmacy Active 8/19/2022 08:00  8/18/2022  Tylenol Tablet (Acetaminophen)   Give 650 mg by mouth every 6 hours as needed for pain and/or fever  Pharmacy Active 8/23/2022 13:00  8/23/2022  Cholecalciferol Capsule   Give 5000 unit by mouth one time a day for vitamin D deficiency  Pharmacy Active 10/19/2022 08:00  10/18/2022  Flomax Capsule (Tamsulosin HCl)   Give 0.4 mg by mouth in the evening related to BENIGN PROSTATIC HYPERPLASIA WITHOUT LOWER URINARY TRACT SYMPTOMS (N40.0)  Pharmacy Active 1/10/2023 17:00  1/10/2023  Donepezil HCl Tablet   Give 10 mg by mouth in the evening related to AGE-RELATED COGNITIVE DECLINE (R41.81)  Pharmacy Active 1/10/2023 17:00  1/10/2023  Multi-Vitamins Tablet (Multiple Vitamin)   Give 1 tablet by mouth in the evening for supplement  Pharmacy Active 1/10/2023 17:00  1/10/2023  amLODIPine Besylate Tablet   Give 5 mg by mouth two  times a day related to ESSENTIAL (PRIMARY) HYPERTENSION (I10)  Pharmacy Active 1/10/2023 17:00  1/10/2023  Artificial Tears Ophthalmic Solution (Carboxymethylcellulose Sodium (Ophth))   Instill 2 drop in both eyes as needed for itchy eyes/dry eyes TWICE DAILY  Pharmacy Active 1/19/2023 12:30  1/19/2023  Eucerin External Cream (Skin Protectants, Misc.)   Apply to affected areas topically in the morning for dry, flaky skin  Pharmacy Active 10/6/2023 08:00  8/2/2024     Provide safe environment for the patient    Continue current medication regimen    OT PT and speech therapy    Bowel and bladder,skin care    Nutritional support    Monitor and treat blood glucose    GI  and DVT prophylaxis    PRN medications    Periodic lab work    Regular Follow up    The patient is being seen for the subsequent annual wellness visit.   Past Medical, Surgical and Family History: reviewed and updated in chart.   Medications and Supplements: Review of all medications by a prescribing practitioner or clinical pharmacist (such as prescriptions, OTCs, herbal therapies and supplements) documented in the medical record.   Patient Self Assessment of Health Status: fair.   Tobacco use: Non-User   Alcohol use: Non-User, As noted in social history   Illicit drug use: Non-User   Current diet: well balanced diet.   Exercise Frequency: infrequently.   Depression/Suicide Screening:  .   During the past 2 weeks, the patient has not felt down, depressed or hopeless.    During the past 2 weeks, the patient has not felt little interest or pleasure in doing things.    Hearing Impairment: Patient has slight hearing impairment.   Cognitive Impairment: Some cognitive impairment observed.      Bathing: needs assistance.   Dressing: needs assistance.   Walking: needs assistance.   Toileting: performs independently.   Feeding: performs independently.   Personal Hygiene: performs independently.   Bowels: continent.   Bladder: continent.   Managing Finances:  needs assistance.   Shopping: needs assistance.   Managing Medications: needs assistance.   Housework / Basic Home Maintenance: needs assistance.   Handling Transportation:  needs assistance.   Preparing Meals: Needs assistance  Using the Telephone/ Communication Devices: performs independently.   Patient resides at an assisted living facility   Falls Risk Screening:.  has not fallen   Home safety risk factors: none and Patient resides at an assisted living facility.   Advance directives:. Advanced Care Planning discussed and documented advance care plan or surrogate decision maker documented in the medical record.   A Conversation about Advance directives of at least 16 minutes has occurred.   Topics discussed: The code status is signed and filed with the assisted living facility.      1. medications are reviewed      2. Continue with rehabilitative, supportive, and or restorative care as ordered and as the patient tolerates     3. Laboratory evaluations will be monitored on an ongoing as needed basis     4. Medications have been cross-referenced with the patient's diagnoses list, and medications reductions have been considered and/or implemented.     5. Pharmacy recommendations are addressed on an ongoing as needed basis.     6. Controlled substances have been electronically scripted every 60 days for opiates and others of similar schedule, and every 6 months for sedative/hypnotics and others of similar schedule.     7. Nursing has been queried about any potential adverse events that need to be reported to me.    Salient information and adjustment of care plan pertaining to this individual patient interaction today are the following:      A. We will continue with restorative and supportive care as the patient tolerates    B. Laboratory examinations will continue to be drawn on an ongoing as-needed basis. The patient's weight needs to be monitored and if needed we may need to institute appetite stimulating  medication    C. The patient's long term prognosis is guarded    Charting is done using voice recognition software and may contain errors which may not have been completely corrected            History of Present Illness    Patient is unable to give detailed history and therefore history is obtained from the chart     No acute complaints or concerns raised by nursing           History from prior hospitalization-  This  gentleman presents to [facility] emergency department. This is a patient who apparently fell and then was left outside for some time he was unable to get up and move around. Apparently he does have some cognitive issues he lives by himself he does appear to be confused today on my examination the granddaughter noticed that on the ring, that the patient was on the ground outside he has been outside for some time EMS was called he is brought back into the hospital where he was resuscitated with fluids and was treated for hypothermia     Problem List/Past Medical History      Ongoing   Hypertension     Prostate cancer     Historical   No qualifying data     Procedure/Surgical History   No qualifying data available.     Medications      Inpatient   0.9%NaCl (Normal Saline) 1,000 mL, 1000 mL, IV     Ambien, 2.5 mg= 1 EA, ORAL, QHS/[id number], PRN     amLODIPine, 5 mg= 1 tabs, ORAL, BID     aspirin, 81 mg= 1 tabs, ORAL, QHS     ciprofloxacin = Cipro, 200 mg= 100 mL, IV Piggyback, [id number]     Flomax, 0.4 mg= 1 caps, ORAL, QHS     multivitamin, 1 tabs, ORAL, QHS     Saline Flush, 3 mL, IV Push, [id number]     Saline Flush, 3 mL, IV Push, PRN, PRN     Tylenol, 650 mg= 2 tabs, ORAL, [id number], PRN     Home   aspirin 81 mg oral delayed release tablet, 81 mg= 1 tabs, ORAL, QHS     Flomax 0.4 mg oral capsule, 0.4 mg= 1 caps, ORAL, QHS     lisinopril 10 mg oral tablet, 20 mg= 2 tabs, ORAL, QHS     multivitamin, 1 tabs, ORAL, QHS     Allergies      Bactrim     Hytrin     penicillin     Social History          Alcohol - Denies Alcohol Use        Substance Abuse - Denies Substance Abuse        Tobacco - Denies Tobacco Use      Review of Systems  All system review as allowed by patient condition and nursing input is negative        Physical Exam     Vital signs as per nursing/MA documentation were reviewd  General appearance: Alert and in no acute distress  HEENT: Normal Inspection  Neck - Normal Inspectiopn  Respiratory : No respiratory distress. Lungs are clear   Cardiovascular: heart rate normal. No gallop  Back - normal inspection  Skin inspection:Warm  Musculoskeletal : No deformities  Neuro : Limited exam. Baseline  Psychiatric : Cooperative        Results/Data  Records including but not limited to electronic medical records, relevant lab work and imaging from patient's health care facility encounter were reviewed and independently verified

## 2025-01-21 ENCOUNTER — NURSING HOME VISIT (OUTPATIENT)
Dept: POST ACUTE CARE | Facility: EXTERNAL LOCATION | Age: OVER 89
End: 2025-01-21
Payer: MEDICARE

## 2025-01-21 DIAGNOSIS — N17.9 AKI (ACUTE KIDNEY INJURY) (CMS-HCC): ICD-10-CM

## 2025-01-21 DIAGNOSIS — E11.59 HYPERTENSION ASSOCIATED WITH DIABETES (MULTI): Primary | ICD-10-CM

## 2025-01-21 DIAGNOSIS — I15.2 HYPERTENSION ASSOCIATED WITH DIABETES (MULTI): Primary | ICD-10-CM

## 2025-01-21 DIAGNOSIS — E11.40 TYPE 2 DIABETES MELLITUS WITH DIABETIC NEUROPATHY, UNSPECIFIED WHETHER LONG TERM INSULIN USE (MULTI): ICD-10-CM

## 2025-01-21 DIAGNOSIS — E87.20 METABOLIC ACIDOSIS: ICD-10-CM

## 2025-01-21 PROCEDURE — 99349 HOME/RES VST EST MOD MDM 40: CPT | Performed by: EMERGENCY MEDICINE

## 2025-01-21 NOTE — LETTER
Patient: Jersey Del Valle  : 1927    Encounter Date: 2025       Provider Impression        [facility]        PRINCIPAL DIAGNOSES:     Acute kidney injury, hyperchloremic non-anion gap  metabolic acidosis, type B lactic acidosis, hypothermia, diabetes type  2, hypertension, hypomagnesemia, demand ischemia, possible urinary tract  infection, dehydration, atelectasis, Mobitz 1 atrioventricular block.            Bisacodyl Suppository 10 MG   Insert 1 application rectally every 24 hours as needed for constipation  Pharmacy Active 2022 12:00  2022  Diff-Stat Capsule (Probiotic Product)   Give 1 capsule by mouth one time a day for GI health  Pharmacy Active 4/15/2022 08:00  2022  MiraLax Powder 17 GM/SCOOP (Polyethylene Glycol 3350)   Give 17 gram by mouth every 24 hours as needed for constipation  Pharmacy Active 2022 12:30  2022  Aspirin Tablet   Give 81 mg by mouth one time a day related to LONG TERM (CURRENT) USE OF ASPIRIN (Z79.82)  Pharmacy Active 2022 08:00  2022  Lisinopril Tablet   Give 10 mg by mouth one time a day related to ESSENTIAL (PRIMARY) HYPERTENSION (I10)  Pharmacy Active 2022 08:00  2022  Tylenol Tablet (Acetaminophen)   Give 650 mg by mouth every 6 hours as needed for pain and/or fever  Pharmacy Active 2022 13:00  2022  Cholecalciferol Capsule   Give 5000 unit by mouth one time a day for vitamin D deficiency  Pharmacy Active 10/19/2022 08:00  10/18/2022  Flomax Capsule (Tamsulosin HCl)   Give 0.4 mg by mouth in the evening related to BENIGN PROSTATIC HYPERPLASIA WITHOUT LOWER URINARY TRACT SYMPTOMS (N40.0)  Pharmacy Active 1/10/2023 17:00  1/10/2023  Donepezil HCl Tablet   Give 10 mg by mouth in the evening related to AGE-RELATED COGNITIVE DECLINE (R41.81)  Pharmacy Active 1/10/2023 17:00  1/10/2023  Multi-Vitamins Tablet (Multiple Vitamin)   Give 1 tablet by mouth in the evening for supplement  Pharmacy Active 1/10/2023  17:00  1/10/2023  amLODIPine Besylate Tablet   Give 5 mg by mouth two times a day related to ESSENTIAL (PRIMARY) HYPERTENSION (I10)  Pharmacy Active 1/10/2023 17:00  1/10/2023  Artificial Tears Ophthalmic Solution (Carboxymethylcellulose Sodium (Ophth))   Instill 2 drop in both eyes as needed for itchy eyes/dry eyes TWICE DAILY  Pharmacy Active 1/19/2023 12:30  1/19/2023  Eucerin External Cream (Skin Protectants, Misc.)   Apply to affected areas topically in the morning for dry, flaky skin  Pharmacy Active 10/6/2023 08:00  8/2/2024     Rx list reviewed.   PT and OT evaluation is in the process.   Routine safety measures, fall precautions, risk modification and alarm placement if needed for prevention of falls.   Skin care precautions, prevention of pressures sores at pressure points assessed.   Pt needs to be monitored frequently by nursing staff particularly at night time.   Any confusion, agitation or behavioural disturbance needs to be attended, as per home policy   rapid covid Ag assay need to be done, notify if positive.   If needed appropriate measures to be taken for alarm placements and assisted devices, pt was told not to get up and ambulate at night unless help and assist available at bedside,   labs will be done as per our routine protocol.   PO intake need to be monitored if consuming po.       Charting is done using voice recognition software and may contain errors which have not been completely corrected       History of Present Illness    Patient is unable to give detailed history and therefore history is obtained from the chart     No acute complaints or concerns raised by nursing           History from prior hospitalization-  This  gentleman presents to [facility] emergency department. This is a patient who apparently fell and then was left outside for some time he was unable to get up and move around. Apparently he does have some cognitive issues he lives by himself he does appear to be confused  today on my examination the granddaughter noticed that on the ring, that the patient was on the ground outside he has been outside for some time EMS was called he is brought back into the hospital where he was resuscitated with fluids and was treated for hypothermia     Problem List/Past Medical History      Ongoing   Hypertension     Prostate cancer     Historical   No qualifying data     Procedure/Surgical History   No qualifying data available.     Medications      Inpatient   0.9%NaCl (Normal Saline) 1,000 mL, 1000 mL, IV     Ambien, 2.5 mg= 1 EA, ORAL, QHS/[id number], PRN     amLODIPine, 5 mg= 1 tabs, ORAL, BID     aspirin, 81 mg= 1 tabs, ORAL, QHS     ciprofloxacin = Cipro, 200 mg= 100 mL, IV Piggyback, [id number]     Flomax, 0.4 mg= 1 caps, ORAL, QHS     multivitamin, 1 tabs, ORAL, QHS     Saline Flush, 3 mL, IV Push, [id number]     Saline Flush, 3 mL, IV Push, PRN, PRN     Tylenol, 650 mg= 2 tabs, ORAL, [id number], PRN     Home   aspirin 81 mg oral delayed release tablet, 81 mg= 1 tabs, ORAL, QHS     Flomax 0.4 mg oral capsule, 0.4 mg= 1 caps, ORAL, QHS     lisinopril 10 mg oral tablet, 20 mg= 2 tabs, ORAL, QHS     multivitamin, 1 tabs, ORAL, QHS     Allergies      Bactrim     Hytrin     penicillin     Social History         Alcohol - Denies Alcohol Use        Substance Abuse - Denies Substance Abuse        Tobacco - Denies Tobacco Use      Review of Systems  All system review as allowed by patient condition and nursing input is negative        Physical Exam     Vital signs as per nursing/MA documentation were reviewd  General appearance: Alert and in no acute distress  HEENT: Normal Inspection  Neck - Normal Inspectiopn  Respiratory : No respiratory distress. Lungs are clear   Cardiovascular: heart rate normal. No gallop  Back - normal inspection  Skin inspection:Warm  Musculoskeletal : No deformities  Neuro : Limited exam. Baseline  Psychiatric : Cooperative        Results/Data  Records including but not  limited to electronic medical records, relevant lab work and imaging from patient's health care facility encounter were reviewed and independently verified      Electronically Signed By: Sekou Shannon MD   2/2/25 10:51 AM

## 2025-01-24 NOTE — PROGRESS NOTES
Provider Impression        [facility]        PRINCIPAL DIAGNOSES:     Acute kidney injury, hyperchloremic non-anion gap  metabolic acidosis, type B lactic acidosis, hypothermia, diabetes type  2, hypertension, hypomagnesemia, demand ischemia, possible urinary tract  infection, dehydration, atelectasis, Mobitz 1 atrioventricular block.            Bisacodyl Suppository 10 MG   Insert 1 application rectally every 24 hours as needed for constipation  Pharmacy Active 4/14/2022 12:00  4/14/2022  Diff-Stat Capsule (Probiotic Product)   Give 1 capsule by mouth one time a day for GI health  Pharmacy Active 4/15/2022 08:00  4/14/2022  MiraLax Powder 17 GM/SCOOP (Polyethylene Glycol 3350)   Give 17 gram by mouth every 24 hours as needed for constipation  Pharmacy Active 4/14/2022 12:30  4/14/2022  Aspirin Tablet   Give 81 mg by mouth one time a day related to LONG TERM (CURRENT) USE OF ASPIRIN (Z79.82)  Pharmacy Active 5/24/2022 08:00  5/17/2022  Lisinopril Tablet   Give 10 mg by mouth one time a day related to ESSENTIAL (PRIMARY) HYPERTENSION (I10)  Pharmacy Active 8/19/2022 08:00  8/18/2022  Tylenol Tablet (Acetaminophen)   Give 650 mg by mouth every 6 hours as needed for pain and/or fever  Pharmacy Active 8/23/2022 13:00  8/23/2022  Cholecalciferol Capsule   Give 5000 unit by mouth one time a day for vitamin D deficiency  Pharmacy Active 10/19/2022 08:00  10/18/2022  Flomax Capsule (Tamsulosin HCl)   Give 0.4 mg by mouth in the evening related to BENIGN PROSTATIC HYPERPLASIA WITHOUT LOWER URINARY TRACT SYMPTOMS (N40.0)  Pharmacy Active 1/10/2023 17:00  1/10/2023  Donepezil HCl Tablet   Give 10 mg by mouth in the evening related to AGE-RELATED COGNITIVE DECLINE (R41.81)  Pharmacy Active 1/10/2023 17:00  1/10/2023  Multi-Vitamins Tablet (Multiple Vitamin)   Give 1 tablet by mouth in the evening for supplement  Pharmacy Active 1/10/2023 17:00  1/10/2023  amLODIPine Besylate Tablet   Give 5 mg by mouth two  times a day related to ESSENTIAL (PRIMARY) HYPERTENSION (I10)  Pharmacy Active 1/10/2023 17:00  1/10/2023  Artificial Tears Ophthalmic Solution (Carboxymethylcellulose Sodium (Ophth))   Instill 2 drop in both eyes as needed for itchy eyes/dry eyes TWICE DAILY  Pharmacy Active 1/19/2023 12:30  1/19/2023  Eucerin External Cream (Skin Protectants, Misc.)   Apply to affected areas topically in the morning for dry, flaky skin  Pharmacy Active 10/6/2023 08:00  8/2/2024     Rx list reviewed.   PT and OT evaluation is in the process.   Routine safety measures, fall precautions, risk modification and alarm placement if needed for prevention of falls.   Skin care precautions, prevention of pressures sores at pressure points assessed.   Pt needs to be monitored frequently by nursing staff particularly at night time.   Any confusion, agitation or behavioural disturbance needs to be attended, as per home policy   rapid covid Ag assay need to be done, notify if positive.   If needed appropriate measures to be taken for alarm placements and assisted devices, pt was told not to get up and ambulate at night unless help and assist available at bedside,   labs will be done as per our routine protocol.   PO intake need to be monitored if consuming po.       Charting is done using voice recognition software and may contain errors which have not been completely corrected       History of Present Illness    Patient is unable to give detailed history and therefore history is obtained from the chart     No acute complaints or concerns raised by nursing           History from prior hospitalization-  This  gentleman presents to [facility] emergency department. This is a patient who apparently fell and then was left outside for some time he was unable to get up and move around. Apparently he does have some cognitive issues he lives by himself he does appear to be confused today on my examination the granddaughter noticed that on the ring, that  the patient was on the ground outside he has been outside for some time EMS was called he is brought back into the hospital where he was resuscitated with fluids and was treated for hypothermia     Problem List/Past Medical History      Ongoing   Hypertension     Prostate cancer     Historical   No qualifying data     Procedure/Surgical History   No qualifying data available.     Medications      Inpatient   0.9%NaCl (Normal Saline) 1,000 mL, 1000 mL, IV     Ambien, 2.5 mg= 1 EA, ORAL, QHS/[id number], PRN     amLODIPine, 5 mg= 1 tabs, ORAL, BID     aspirin, 81 mg= 1 tabs, ORAL, QHS     ciprofloxacin = Cipro, 200 mg= 100 mL, IV Piggyback, [id number]     Flomax, 0.4 mg= 1 caps, ORAL, QHS     multivitamin, 1 tabs, ORAL, QHS     Saline Flush, 3 mL, IV Push, [id number]     Saline Flush, 3 mL, IV Push, PRN, PRN     Tylenol, 650 mg= 2 tabs, ORAL, [id number], PRN     Home   aspirin 81 mg oral delayed release tablet, 81 mg= 1 tabs, ORAL, QHS     Flomax 0.4 mg oral capsule, 0.4 mg= 1 caps, ORAL, QHS     lisinopril 10 mg oral tablet, 20 mg= 2 tabs, ORAL, QHS     multivitamin, 1 tabs, ORAL, QHS     Allergies      Bactrim     Hytrin     penicillin     Social History         Alcohol - Denies Alcohol Use        Substance Abuse - Denies Substance Abuse        Tobacco - Denies Tobacco Use      Review of Systems  All system review as allowed by patient condition and nursing input is negative        Physical Exam     Vital signs as per nursing/MA documentation were reviewd  General appearance: Alert and in no acute distress  HEENT: Normal Inspection  Neck - Normal Inspectiopn  Respiratory : No respiratory distress. Lungs are clear   Cardiovascular: heart rate normal. No gallop  Back - normal inspection  Skin inspection:Warm  Musculoskeletal : No deformities  Neuro : Limited exam. Baseline  Psychiatric : Cooperative        Results/Data  Records including but not limited to electronic medical records, relevant lab work and imaging from  patient's health care facility encounter were reviewed and independently verified

## 2025-04-01 ENCOUNTER — HOME VISIT (OUTPATIENT)
Dept: POST ACUTE CARE | Facility: EXTERNAL LOCATION | Age: OVER 89
End: 2025-04-01
Payer: MEDICARE

## 2025-04-01 DIAGNOSIS — I15.2 HYPERTENSION ASSOCIATED WITH DIABETES: Primary | ICD-10-CM

## 2025-04-01 DIAGNOSIS — I25.118 CORONARY ARTERY DISEASE INVOLVING NATIVE HEART WITH OTHER FORM OF ANGINA PECTORIS, UNSPECIFIED VESSEL OR LESION TYPE: ICD-10-CM

## 2025-04-01 DIAGNOSIS — N17.9 AKI (ACUTE KIDNEY INJURY): ICD-10-CM

## 2025-04-01 DIAGNOSIS — E11.59 HYPERTENSION ASSOCIATED WITH DIABETES: Primary | ICD-10-CM

## 2025-04-01 DIAGNOSIS — E11.40 TYPE 2 DIABETES MELLITUS WITH DIABETIC NEUROPATHY, UNSPECIFIED WHETHER LONG TERM INSULIN USE (MULTI): ICD-10-CM

## 2025-04-01 PROCEDURE — 99348 HOME/RES VST EST LOW MDM 30: CPT | Performed by: EMERGENCY MEDICINE

## 2025-04-10 NOTE — PROGRESS NOTES
Provider Impression        [facility]        PRINCIPAL DIAGNOSES:     Acute kidney injury, hyperchloremic non-anion gap  metabolic acidosis, type B lactic acidosis, hypothermia, diabetes type  2, hypertension, hypomagnesemia, demand ischemia, possible urinary tract  infection, dehydration, atelectasis, Mobitz 1 atrioventricular block.            Bisacodyl Suppository 10 MG   Insert 1 application rectally every 24 hours as needed for constipation  Pharmacy Active 4/14/2022 12:00  4/14/2022  Diff-Stat Capsule (Probiotic Product)   Give 1 capsule by mouth one time a day for GI health  Pharmacy Active 4/15/2022 08:00  4/14/2022  MiraLax Powder 17 GM/SCOOP (Polyethylene Glycol 3350)   Give 17 gram by mouth every 24 hours as needed for constipation  Pharmacy Active 4/14/2022 12:30  4/14/2022  Aspirin Tablet   Give 81 mg by mouth one time a day related to LONG TERM (CURRENT) USE OF ASPIRIN (Z79.82)  Pharmacy Active 5/24/2022 08:00  5/17/2022  Lisinopril Tablet   Give 10 mg by mouth one time a day related to ESSENTIAL (PRIMARY) HYPERTENSION (I10)  Pharmacy Active 8/19/2022 08:00  8/18/2022  Tylenol Tablet (Acetaminophen)   Give 650 mg by mouth every 6 hours as needed for pain and/or fever  Pharmacy Active 8/23/2022 13:00  8/23/2022  Cholecalciferol Capsule   Give 5000 unit by mouth one time a day for vitamin D deficiency  Pharmacy Active 10/19/2022 08:00  10/18/2022  Flomax Capsule (Tamsulosin HCl)   Give 0.4 mg by mouth in the evening related to BENIGN PROSTATIC HYPERPLASIA WITHOUT LOWER URINARY TRACT SYMPTOMS (N40.0)  Pharmacy Active 1/10/2023 17:00  1/10/2023  Donepezil HCl Tablet   Give 10 mg by mouth in the evening related to AGE-RELATED COGNITIVE DECLINE (R41.81)  Pharmacy Active 1/10/2023 17:00  1/10/2023  Multi-Vitamins Tablet (Multiple Vitamin)   Give 1 tablet by mouth in the evening for supplement  Pharmacy Active 1/10/2023 17:00  1/10/2023  amLODIPine Besylate Tablet   Give 5 mg by mouth two  times a day related to ESSENTIAL (PRIMARY) HYPERTENSION (I10)  Pharmacy Active 1/10/2023 17:00  1/10/2023  Artificial Tears Ophthalmic Solution (Carboxymethylcellulose Sodium (Ophth))   Instill 2 drop in both eyes as needed for itchy eyes/dry eyes TWICE DAILY  Pharmacy Active 1/19/2023 12:30  1/19/2023  Eucerin External Cream (Skin Protectants, Misc.)   Apply to affected areas topically in the morning for dry, flaky skin  Pharmacy Active 10/6/2023 08:00  8/2/2024     1. medications are reviewed      2. Continue with rehabilitative, supportive, and or restorative care as ordered and as the patient tolerates     3. Laboratory evaluations will be monitored on an ongoing as needed basis     4. Medications have been cross-referenced with the patient's diagnoses list, and medications reductions have been considered and/or implemented.     5. Pharmacy recommendations are addressed on an ongoing as needed basis.     6. Controlled substances have been electronically scripted every 60 days for opiates and others of similar schedule, and every 6 months for sedative/hypnotics and others of similar schedule.     7. Nursing has been queried about any potential adverse events that need to be reported to me.    Salient information and adjustment of care plan pertaining to this individual patient interaction today are the following:      A. We will continue with restorative and supportive care as the patient tolerates    B. Laboratory examinations will continue to be drawn on an ongoing as-needed basis. The patient's weight needs to be monitored and if needed we may need to institute appetite stimulating medication    C. The patient's long term prognosis is guarded    Charting is done using voice recognition software and may contain errors which may not have been completely corrected         History of Present Illness    Patient is unable to give detailed history and therefore history is obtained from the chart     No acute  complaints or concerns raised by nursing           History from prior hospitalization-  This  gentleman presents to [facility] emergency department. This is a patient who apparently fell and then was left outside for some time he was unable to get up and move around. Apparently he does have some cognitive issues he lives by himself he does appear to be confused today on my examination the granddaughter noticed that on the ring, that the patient was on the ground outside he has been outside for some time EMS was called he is brought back into the hospital where he was resuscitated with fluids and was treated for hypothermia     Problem List/Past Medical History      Ongoing   Hypertension     Prostate cancer     Historical   No qualifying data     Procedure/Surgical History   No qualifying data available.     Medications      Inpatient   0.9%NaCl (Normal Saline) 1,000 mL, 1000 mL, IV     Ambien, 2.5 mg= 1 EA, ORAL, [id number], PRN     amLODIPine, 5 mg= 1 tabs, ORAL, BID     aspirin, 81 mg= 1 tabs, ORAL, QHS     ciprofloxacin = Cipro, 200 mg= 100 mL, IV Piggyback, [id number]     Flomax, 0.4 mg= 1 caps, ORAL, QHS     multivitamin, 1 tabs, ORAL, QHS     Saline Flush, 3 mL, IV Push, [id number]     Saline Flush, 3 mL, IV Push, PRN, PRN     Tylenol, 650 mg= 2 tabs, ORAL, [id number], PRN     Home   aspirin 81 mg oral delayed release tablet, 81 mg= 1 tabs, ORAL, QHS     Flomax 0.4 mg oral capsule, 0.4 mg= 1 caps, ORAL, QHS     lisinopril 10 mg oral tablet, 20 mg= 2 tabs, ORAL, QHS     multivitamin, 1 tabs, ORAL, QHS     Allergies      Bactrim     Hytrin     penicillin     Social History         Alcohol - Denies Alcohol Use        Substance Abuse - Denies Substance Abuse        Tobacco - Denies Tobacco Use      Review of Systems  All system review as allowed by patient condition and nursing input is negative        Physical Exam     Vital signs as per nursing/MA documentation were reviewd  General appearance: Alert and in no  acute distress  HEENT: Normal Inspection  Neck - Normal Inspectiopn  Respiratory : No respiratory distress. Lungs are clear   Cardiovascular: heart rate normal. No gallop  Back - normal inspection  Skin inspection:Warm  Musculoskeletal : No deformities  Neuro : Limited exam. Baseline  Psychiatric : Cooperative        Results/Data  Records including but not limited to electronic medical records, relevant lab work and imaging from patient's health care facility encounter were reviewed and independently verified

## 2025-07-08 ENCOUNTER — HOME VISIT (OUTPATIENT)
Dept: POST ACUTE CARE | Facility: EXTERNAL LOCATION | Age: OVER 89
End: 2025-07-08
Payer: MEDICARE

## 2025-07-08 DIAGNOSIS — N17.9 AKI (ACUTE KIDNEY INJURY): ICD-10-CM

## 2025-07-08 DIAGNOSIS — I25.118 CORONARY ARTERY DISEASE INVOLVING NATIVE HEART WITH OTHER FORM OF ANGINA PECTORIS, UNSPECIFIED VESSEL OR LESION TYPE: ICD-10-CM

## 2025-07-08 DIAGNOSIS — I15.2 HYPERTENSION ASSOCIATED WITH DIABETES: ICD-10-CM

## 2025-07-08 DIAGNOSIS — E11.59 HYPERTENSION ASSOCIATED WITH DIABETES: ICD-10-CM

## 2025-07-08 DIAGNOSIS — E11.40 TYPE 2 DIABETES MELLITUS WITH DIABETIC NEUROPATHY, UNSPECIFIED WHETHER LONG TERM INSULIN USE (MULTI): Primary | ICD-10-CM

## 2025-07-08 PROCEDURE — 99349 HOME/RES VST EST MOD MDM 40: CPT | Performed by: EMERGENCY MEDICINE

## 2025-07-11 NOTE — PROGRESS NOTES
Provider Impression        [facility]        PRINCIPAL DIAGNOSES:     Acute kidney injury, hyperchloremic non-anion gap  metabolic acidosis, type B lactic acidosis, hypothermia, diabetes type  2, hypertension, hypomagnesemia, demand ischemia, possible urinary tract  infection, dehydration, atelectasis, Mobitz 1 atrioventricular block.            Bisacodyl Suppository 10 MG   Insert 1 application rectally every 24 hours as needed for constipation  Pharmacy Active 4/14/2022 12:00  4/14/2022  Diff-Stat Capsule (Probiotic Product)   Give 1 capsule by mouth one time a day for GI health  Pharmacy Active 4/15/2022 08:00  4/14/2022  MiraLax Powder 17 GM/SCOOP (Polyethylene Glycol 3350)   Give 17 gram by mouth every 24 hours as needed for constipation  Pharmacy Active 4/14/2022 12:30  4/14/2022  Aspirin Tablet   Give 81 mg by mouth one time a day related to LONG TERM (CURRENT) USE OF ASPIRIN (Z79.82)  Pharmacy Active 5/24/2022 08:00  5/17/2022  Lisinopril Tablet   Give 10 mg by mouth one time a day related to ESSENTIAL (PRIMARY) HYPERTENSION (I10)  Pharmacy Active 8/19/2022 08:00  8/18/2022  Tylenol Tablet (Acetaminophen)   Give 650 mg by mouth every 6 hours as needed for pain and/or fever  Pharmacy Active 8/23/2022 13:00  8/23/2022  Cholecalciferol Capsule   Give 5000 unit by mouth one time a day for vitamin D deficiency  Pharmacy Active 10/19/2022 08:00  10/18/2022  Flomax Capsule (Tamsulosin HCl)   Give 0.4 mg by mouth in the evening related to BENIGN PROSTATIC HYPERPLASIA WITHOUT LOWER URINARY TRACT SYMPTOMS (N40.0)  Pharmacy Active 1/10/2023 17:00  1/10/2023  Donepezil HCl Tablet   Give 10 mg by mouth in the evening related to AGE-RELATED COGNITIVE DECLINE (R41.81)  Pharmacy Active 1/10/2023 17:00  1/10/2023  Multi-Vitamins Tablet (Multiple Vitamin)   Give 1 tablet by mouth in the evening for supplement  Pharmacy Active 1/10/2023 17:00  1/10/2023  amLODIPine Besylate Tablet   Give 5 mg by mouth two  times a day related to ESSENTIAL (PRIMARY) HYPERTENSION (I10)  Pharmacy Active 1/10/2023 17:00  1/10/2023  Artificial Tears Ophthalmic Solution (Carboxymethylcellulose Sodium (Ophth))   Instill 2 drop in both eyes as needed for itchy eyes/dry eyes TWICE DAILY  Pharmacy Active 1/19/2023 12:30  1/19/2023  Eucerin External Cream (Skin Protectants, Misc.)   Apply to affected areas topically in the morning for dry, flaky skin  Pharmacy Active 10/6/2023 08:00  8/2/2024     This patient was seen for my regular monthly visit, nursing evaluations and nursing notes were reviewed, interim events are reviewed, interim concerns and messages were reviewed as we have communicated with nursing staff.  Any issues with the falls, skin care impairment, declining physical condition are reviewed and noted, diagnosis list were reviewed, list of medications were reviewed, living will related issues were reviewed, overall patient has been doing well, any declining in patient's condition or any change in patient's condition needs to be notified to physician promptly, discussed with nursing staff, if needed would communicate with family.  Patient stays confined here at the facility for long-term care, there are always concerns about long-term care related issues and concerns.  Nursing staff is trying their best to keep patient safe, all sort of measures has been taken to keep patient safe and comfortable.        History of Present Illness    Patient is unable to give detailed history and therefore history is obtained from the chart     No acute complaints or concerns raised by nursing           History from prior hospitalization-  This  gentleman presents to [facility] emergency department. This is a patient who apparently fell and then was left outside for some time he was unable to get up and move around. Apparently he does have some cognitive issues he lives by himself he does appear to be confused today on my examination the granddaughter  noticed that on the ring, that the patient was on the ground outside he has been outside for some time EMS was called he is brought back into the hospital where he was resuscitated with fluids and was treated for hypothermia     Problem List/Past Medical History      Ongoing   Hypertension     Prostate cancer     Historical   No qualifying data     Procedure/Surgical History   No qualifying data available.     Medications      Inpatient   0.9%NaCl (Normal Saline) 1,000 mL, 1000 mL, IV     Ambien, 2.5 mg= 1 EA, ORAL, [id number], PRN     amLODIPine, 5 mg= 1 tabs, ORAL, BID     aspirin, 81 mg= 1 tabs, ORAL, QHS     ciprofloxacin = Cipro, 200 mg= 100 mL, IV Piggyback, [id number]     Flomax, 0.4 mg= 1 caps, ORAL, QHS     multivitamin, 1 tabs, ORAL, QHS     Saline Flush, 3 mL, IV Push, [id number]     Saline Flush, 3 mL, IV Push, PRN, PRN     Tylenol, 650 mg= 2 tabs, ORAL, [id number], PRN     Home   aspirin 81 mg oral delayed release tablet, 81 mg= 1 tabs, ORAL, QHS     Flomax 0.4 mg oral capsule, 0.4 mg= 1 caps, ORAL, QHS     lisinopril 10 mg oral tablet, 20 mg= 2 tabs, ORAL, QHS     multivitamin, 1 tabs, ORAL, QHS     Allergies      Bactrim     Hytrin     penicillin     Social History         Alcohol - Denies Alcohol Use        Substance Abuse - Denies Substance Abuse        Tobacco - Denies Tobacco Use      Review of Systems  All system review as allowed by patient condition and nursing input is negative        Physical Exam     Vital signs as per nursing/MA documentation were reviewd  General appearance: Alert and in no acute distress  HEENT: Normal Inspection  Neck - Normal Inspectiopn  Respiratory : No respiratory distress. Lungs are clear   Cardiovascular: heart rate normal. No gallop  Back - normal inspection  Skin inspection:Warm  Musculoskeletal : No deformities  Neuro : Limited exam. Baseline  Psychiatric : Cooperative        Results/Data  Records including but not limited to electronic medical records,  relevant lab work and imaging from patient's health care facility encounter were reviewed and independently verified